# Patient Record
Sex: MALE | Race: OTHER | Employment: PART TIME | URBAN - NONMETROPOLITAN AREA
[De-identification: names, ages, dates, MRNs, and addresses within clinical notes are randomized per-mention and may not be internally consistent; named-entity substitution may affect disease eponyms.]

---
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
## 2024-08-07 ENCOUNTER — HOSPITAL ENCOUNTER (OUTPATIENT)
Age: 24
Discharge: HOME | End: 2024-08-07
Payer: COMMERCIAL

## 2024-08-07 ENCOUNTER — OFFICE VISIT (OUTPATIENT)
Dept: PRIMARY CARE CLINIC | Age: 24
End: 2024-08-07
Payer: COMMERCIAL

## 2024-08-07 ENCOUNTER — HOSPITAL ENCOUNTER (OUTPATIENT)
Dept: ULTRASOUND IMAGING | Age: 24
Discharge: HOME | End: 2024-08-09
Payer: COMMERCIAL

## 2024-08-07 VITALS
OXYGEN SATURATION: 99 % | RESPIRATION RATE: 16 BRPM | TEMPERATURE: 97.6 F | BODY MASS INDEX: 21.33 KG/M2 | DIASTOLIC BLOOD PRESSURE: 74 MMHG | HEIGHT: 65 IN | SYSTOLIC BLOOD PRESSURE: 110 MMHG | HEART RATE: 77 BPM | WEIGHT: 128 LBS

## 2024-08-07 DIAGNOSIS — N50.82 SCROTAL PAIN: Primary | ICD-10-CM

## 2024-08-07 DIAGNOSIS — N50.82 SCROTAL PAIN: ICD-10-CM

## 2024-08-07 LAB
BILIRUB UR QL STRIP: NEGATIVE
CLARITY UR: CLEAR
COLOR UR: YELLOW
COMMENT: ABNORMAL
GLUCOSE UR STRIP-MCNC: NEGATIVE MG/DL
HGB UR QL STRIP.AUTO: NEGATIVE
KETONES UR STRIP-MCNC: NEGATIVE MG/DL
LEUKOCYTE ESTERASE UR QL STRIP: NEGATIVE
NITRITE UR QL STRIP: NEGATIVE
PH UR STRIP: 6.5 [PH] (ref 5–6)
PROT UR STRIP-MCNC: NEGATIVE MG/DL
SP GR UR STRIP: 1.01 (ref 1.01–1.02)
UROBILINOGEN UR STRIP-ACNC: NORMAL EU/DL (ref 0–1)

## 2024-08-07 PROCEDURE — 93976 VASCULAR STUDY: CPT

## 2024-08-07 PROCEDURE — 81003 URINALYSIS AUTO W/O SCOPE: CPT

## 2024-08-07 PROCEDURE — 99213 OFFICE O/P EST LOW 20 MIN: CPT

## 2024-08-07 RX ORDER — KETOROLAC TROMETHAMINE 10 MG/1
10 TABLET, FILM COATED ORAL EVERY 6 HOURS PRN
COMMUNITY
Start: 2024-05-28 | End: 2024-08-07

## 2024-08-07 RX ORDER — CYCLOBENZAPRINE HCL 10 MG
TABLET ORAL
COMMUNITY
Start: 2024-05-28 | End: 2024-08-07

## 2024-08-07 SDOH — ECONOMIC STABILITY: FOOD INSECURITY: WITHIN THE PAST 12 MONTHS, YOU WORRIED THAT YOUR FOOD WOULD RUN OUT BEFORE YOU GOT MONEY TO BUY MORE.: NEVER TRUE

## 2024-08-07 SDOH — ECONOMIC STABILITY: INCOME INSECURITY: HOW HARD IS IT FOR YOU TO PAY FOR THE VERY BASICS LIKE FOOD, HOUSING, MEDICAL CARE, AND HEATING?: NOT HARD AT ALL

## 2024-08-07 SDOH — ECONOMIC STABILITY: HOUSING INSECURITY
IN THE LAST 12 MONTHS, WAS THERE A TIME WHEN YOU DID NOT HAVE A STEADY PLACE TO SLEEP OR SLEPT IN A SHELTER (INCLUDING NOW)?: NO

## 2024-08-07 SDOH — ECONOMIC STABILITY: FOOD INSECURITY: WITHIN THE PAST 12 MONTHS, THE FOOD YOU BOUGHT JUST DIDN'T LAST AND YOU DIDN'T HAVE MONEY TO GET MORE.: NEVER TRUE

## 2024-08-07 ASSESSMENT — PATIENT HEALTH QUESTIONNAIRE - PHQ9
SUM OF ALL RESPONSES TO PHQ9 QUESTIONS 1 & 2: 0
2. FEELING DOWN, DEPRESSED OR HOPELESS: NOT AT ALL
SUM OF ALL RESPONSES TO PHQ QUESTIONS 1-9: 0
1. LITTLE INTEREST OR PLEASURE IN DOING THINGS: NOT AT ALL

## 2024-08-07 ASSESSMENT — ENCOUNTER SYMPTOMS: ABDOMINAL PAIN: 0

## 2024-08-07 NOTE — PROGRESS NOTES
[facility] [place] [facility] department of [facility]  [address] [facility] [address]  Phone: [phone]  Fax: [phone]      Maria Eugenia Anglin is a 24 y.o. male who presents to the [facility] today for his medical conditions/complaints as noted below. Maria Eugenia Anglin is c/o of Groin Pain (The patient states that their have been having groin discomfort- //Pain is described as  \"like I am being poked from the inside out\" - they have notices \"knots\" in the area./)          HPI:     Groin Pain  The patient's primary symptoms include scrotal swelling (yesterday) and testicular pain (bilateral). This is a new problem. The current episode started more than 1 month ago (since april). The problem occurs intermittently. The problem has been waxing and waning. The pain is moderate. Pertinent negatives include no abdominal pain, discolored urine, dysuria, fever, flank pain, frequency, hematuria, rash, urgency or urinary retention. The testicular pain affects both testicles. There is swelling in both testicles. The color of the testicles is Normal. Nothing aggravates the symptoms. Treatments tried: muscle cream. The treatment provided no relief. He is not sexually active. He never uses condoms. No, his partner does not have an STD. There is no history of chlamydia or an inguinal hernia.       Past Medical History:   Diagnosis Date    Abdominal pain     Allergic rhinitis     Scabies         No Known Allergies    Wt Readings from Last 3 Encounters:   08/07/24 58.1 kg (128 lb)   03/01/18 76.7 kg (169 lb 3.2 oz) (90 %, Z= 1.27)*   01/05/18 78.9 kg (174 lb) (93 %, Z= 1.45)*     * Growth percentiles are based on CDC (Boys, 2-20 Years) data.     BP Readings from Last 3 Encounters:   08/07/24 110/74   06/19/18 (!) 131/93   03/01/18 116/72 (59 %, Z = 0.23 /  73 %, Z = 0.61)*     *BP percentiles are based on the 2017 AAP Clinical Practice Guideline for boys      Temp Readings from Last 3

## 2024-08-07 NOTE — PATIENT INSTRUCTIONS
Increase miralax to two capful's daily  Rotate tylenol/motrin for pain/discomfort  Cool compresses to area of pain for 15 minutes, three times a day  Follow up with PCP